# Patient Record
Sex: MALE | Race: OTHER | URBAN - METROPOLITAN AREA
[De-identification: names, ages, dates, MRNs, and addresses within clinical notes are randomized per-mention and may not be internally consistent; named-entity substitution may affect disease eponyms.]

---

## 2018-11-12 ENCOUNTER — OFFICE VISIT (OUTPATIENT)
Dept: FAMILY MEDICINE CLINIC | Age: 28
End: 2018-11-12

## 2018-11-12 VITALS
SYSTOLIC BLOOD PRESSURE: 137 MMHG | DIASTOLIC BLOOD PRESSURE: 88 MMHG | HEART RATE: 59 BPM | WEIGHT: 189 LBS | HEIGHT: 70 IN | TEMPERATURE: 97.5 F | BODY MASS INDEX: 27.06 KG/M2

## 2018-11-12 DIAGNOSIS — R21 RASH AND NONSPECIFIC SKIN ERUPTION: Primary | ICD-10-CM

## 2018-11-12 RX ORDER — HYDROCORTISONE 1 %
CREAM (GRAM) TOPICAL 2 TIMES DAILY
Qty: 30 G | Refills: 2 | Status: SHIPPED | OUTPATIENT
Start: 2018-11-12 | End: 2019-01-23

## 2018-11-12 RX ORDER — PREDNISONE 10 MG/1
TABLET ORAL
Qty: 21 TAB | Refills: 0 | Status: SHIPPED | OUTPATIENT
Start: 2018-11-12 | End: 2019-01-23

## 2018-11-12 RX ORDER — LORATADINE 10 MG/1
10 TABLET ORAL DAILY
Qty: 30 TAB | Refills: 2 | Status: SHIPPED | OUTPATIENT
Start: 2018-11-12 | End: 2019-01-23

## 2018-11-12 NOTE — PROGRESS NOTES
Assessment/Plan:    Diagnoses and all orders for this visit:    1. Rash and nonspecific skin eruption    Other orders  -     predniSONE (DELTASONE) 10 mg tablet; Si today then 1 less pill daily until all pills are gone  -     loratadine (CLARITIN) 10 mg tablet; Take 1 Tab by mouth daily. -     hydrocortisone (CORTAID) 1 % topical cream; Apply  to affected area two (2) times a day. use thin layer to rash on arms bid        Follow-up Disposition:  Return if symptoms worsen or fail to improve. YING Lees expressed understanding of this plan. An AVS was printed and given to the patient.      ----------------------------------------------------------------------    Chief Complaint   Patient presents with    Rash       History of Present Illness:    Pt has had progressively worsening rash on his extremities for months now. 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 He states that the rash is pruritic in nature and makes it difficult to sleep  He has no chronic medical conditions, no hx of diabetes  He states that no one else in his house has the rash  He is from [facility] originally, lived in [place] for the past 4 years and only developed the rash when he moved to [place]  No past medical history on file. Current Outpatient Medications   Medication Sig Dispense Refill    predniSONE (DELTASONE) 10 mg tablet Si today then 1 less pill daily until all pills are gone 21 Tab 0    loratadine (CLARITIN) 10 mg tablet Take 1 Tab by mouth daily. 30 Tab 2    hydrocortisone (CORTAID) 1 % topical cream Apply  to affected area two (2) times a day. use thin layer to rash on arms bid 30 g 2       No Known Allergies    Social History     Tobacco Use    Smoking status: Never Smoker    Smokeless tobacco: Never Used   Substance Use Topics    Alcohol use: No     Frequency: Never    Drug use:  No No family history on file. Physical Exam:     Visit Vitals  /88 (BP 1 Location: Left arm, BP Patient Position: Sitting)   Pulse (!) 59   Temp 97.5 °F (36.4 °C) (Oral)   Ht 5' 9.78\" (1.772 m)   Wt 189 lb (85.7 kg)   BMI 27.29 kg/m²     Looks well, pleasant  A&Ox3  WDWN NAD  Respirations normal and non labored  Skin- he has linear excoriations of red raised rash on his arms.  He states that he has had the rash on his upper thighs but he does not have it today on exam.

## 2018-11-12 NOTE — PATIENT INSTRUCTIONS
Salpullido: Instrucciones de cuidado - [ Rash: Care Instructions ]  Instrucciones de cuidado  Un salpullido es rob irritación o inflamación de la piel. Los salpullidos tienen muchas causas posibles, jeffrey [place], infecciones, [place], calor y estrés emocional.  La atención de seguimiento es rob parte clave de mary tratamiento y seguridad. Asegúrese de hacer y acudir a todas las citas, y llame a mary médico si está teniendo problemas. También es rob buena idea saber los resultados de andrea exámenes y mantener rob lista de los medicamentos que sandra. ¿Cómo puede cuidarse en el hogar? · Lávese la adama sólo con agua. El jabón puede empeorar la sequedad y la comezón. Seque la adama con toques suaves de toalla. · Colóquese paños fríos húmedos en el salpullido para reducir la comezón. · Manténgase fresco y evite el sol. · Deje el salpullido en contacto con el aire tanto jeffrey sea posible. · En ocasiones la vaselina puede ayudar a [employer] causadas por un salpullido. También podría ayudar rob loción humectante, jeffrey, por ejemplo, Cetaphil. Rob loción de calamina puede ayudar si el salpullido es causado por el contacto con algo (jeffrey rob planta o jabón) que haya irritado la piel. Úsela 3 ó 4 veces al día. · Si mary médico le recetó rob crema, úsela según las indicaciones. Si mary médico le recetó medicamentos, tómelos exactamente según las indicaciones. · Si el salpullido le pica tanto que interfiere con andrea actividades normales, tome un antihistamínico de venta [place], jeffrey difenhidramina (Benadryl) o loratadina (Claritin). Deborah y siga todas las instrucciones de la [place]. ¿Cuándo debe pedir ayuda? Llame a mary médico ahora mismo o busque atención médica inmediata si:    · Tiene señales de infección, tales jeffrey:  ? Aumento del dolor, la hinchazón, el enrojecimiento o la temperatura. ? Vetas rojizas que salen de la adama. ? Pus que supura de la adama.   ? Dominik [place].     · Tiene dolor en las articulaciones junto con el salpullido.    Preste especial atención a los cambios en mary trung y asegúrese de comunicarse con mary médico si:    · El salpullido cambia o JENIFFER. Por ejemplo, llame si tiene dolor junto con el salpullido, el salpullido se extiende o tiene ampollas nuevas.     · No mejora después de 1 semana. ¿Dónde puede encontrar más información en inglés? Andrade Kuhn a http://julio-nicole.info/. Ismael Arevaloerer R780 en la búsqueda para aprender más acerca de \"Salpullido: Instrucciones de cuidado - [ Rash: Care Instructions ]. \"  Revisado: 18 sp, 2018  Versión del contenido: 11.8  © 8659-4282 [employer]. Las instrucciones de cuidado fueron adaptadas bajo licencia por [employer] (which disclaims liability or warranty for this information). Si usted tiene [place] afección médica o sobre estas instrucciones, siempre pregunte a mary profesional de trung. [employer] niega toda garantía o responsabilidad por mary uso de esta información.

## 2018-11-12 NOTE — PROGRESS NOTES
AVs printed and reviewed. E script discussed at [facility]. Good Rx coupon available for Loratadine for $4.46. F/u as needed. Discussion assisted by CAV , Andrew Love.

## 2019-01-23 ENCOUNTER — OFFICE VISIT (OUTPATIENT)
Dept: FAMILY MEDICINE CLINIC | Age: 29
End: 2019-01-23

## 2019-01-23 ENCOUNTER — HOSPITAL ENCOUNTER (OUTPATIENT)
Dept: LAB | Age: 29
Discharge: HOME | End: 2019-01-23

## 2019-01-23 VITALS
OXYGEN SATURATION: 99 % | TEMPERATURE: 98.2 F | SYSTOLIC BLOOD PRESSURE: 123 MMHG | BODY MASS INDEX: 28.24 KG/M2 | DIASTOLIC BLOOD PRESSURE: 81 MMHG | WEIGHT: 195.6 LBS | HEART RATE: 66 BPM

## 2019-01-23 DIAGNOSIS — B36.9 FUNGAL INFECTION OF SKIN: Primary | ICD-10-CM

## 2019-01-23 DIAGNOSIS — R20.2 NUMBNESS AND TINGLING: ICD-10-CM

## 2019-01-23 DIAGNOSIS — R20.0 NUMBNESS AND TINGLING: ICD-10-CM

## 2019-01-23 DIAGNOSIS — Z23 ENCOUNTER FOR IMMUNIZATION: ICD-10-CM

## 2019-01-23 LAB
ALBUMIN SERPL-MCNC: 4.5 G/DL (ref 3.5–5)
ALBUMIN/GLOB SERPL: 1.3 {RATIO} (ref 1.1–2.2)
ALP SERPL-CCNC: 69 U/L (ref 45–117)
ALT SERPL-CCNC: 28 U/L (ref 12–78)
ANION GAP SERPL CALC-SCNC: 7 MMOL/L (ref 5–15)
AST SERPL-CCNC: 18 U/L (ref 15–37)
BASOPHILS # BLD: 0 K/UL (ref 0–0.1)
BASOPHILS NFR BLD: 0 % (ref 0–1)
BILIRUB SERPL-MCNC: 0.5 MG/DL (ref 0.2–1)
BUN SERPL-MCNC: 12 MG/DL (ref 6–20)
BUN/CREAT SERPL: 13 (ref 12–20)
CALCIUM SERPL-MCNC: 9.5 MG/DL (ref 8.5–10.1)
CHLORIDE SERPL-SCNC: 104 MMOL/L (ref 97–108)
CHOLEST SERPL-MCNC: 171 MG/DL
CO2 SERPL-SCNC: 29 MMOL/L (ref 21–32)
CREAT SERPL-MCNC: 0.92 MG/DL (ref 0.7–1.3)
DIFFERENTIAL METHOD BLD: ABNORMAL
EOSINOPHIL # BLD: 0.1 K/UL (ref 0–0.4)
EOSINOPHIL NFR BLD: 1 % (ref 0–7)
ERYTHROCYTE [DISTWIDTH] IN BLOOD BY AUTOMATED COUNT: 14.1 % (ref 11.5–14.5)
EST. AVERAGE GLUCOSE BLD GHB EST-MCNC: 117 MG/DL
GLOBULIN SER CALC-MCNC: 3.4 G/DL (ref 2–4)
GLUCOSE SERPL-MCNC: 89 MG/DL (ref 65–100)
HBA1C MFR BLD: 5.7 % (ref 4.2–6.3)
HCT VFR BLD AUTO: 49.8 % (ref 36.6–50.3)
HDLC SERPL-MCNC: 40 MG/DL
HDLC SERPL: 4.3 {RATIO} (ref 0–5)
HGB BLD-MCNC: 15.2 G/DL (ref 12.1–17)
IMM GRANULOCYTES # BLD AUTO: 0 K/UL (ref 0–0.04)
IMM GRANULOCYTES NFR BLD AUTO: 0 % (ref 0–0.5)
LDLC SERPL CALC-MCNC: 104.8 MG/DL (ref 0–100)
LIPID PROFILE,FLP: ABNORMAL
LYMPHOCYTES # BLD: 1.8 K/UL (ref 0.8–3.5)
LYMPHOCYTES NFR BLD: 27 % (ref 12–49)
MCH RBC QN AUTO: 25.7 PG (ref 26–34)
MCHC RBC AUTO-ENTMCNC: 30.5 G/DL (ref 30–36.5)
MCV RBC AUTO: 84.1 FL (ref 80–99)
MONOCYTES # BLD: 0.6 K/UL (ref 0–1)
MONOCYTES NFR BLD: 9 % (ref 5–13)
NEUTS SEG # BLD: 4.1 K/UL (ref 1.8–8)
NEUTS SEG NFR BLD: 62 % (ref 32–75)
NRBC # BLD: 0 K/UL (ref 0–0.01)
NRBC BLD-RTO: 0 PER 100 WBC
PLATELET # BLD AUTO: 237 K/UL (ref 150–400)
PMV BLD AUTO: 11.3 FL (ref 8.9–12.9)
POTASSIUM SERPL-SCNC: 4.5 MMOL/L (ref 3.5–5.1)
PROT SERPL-MCNC: 7.9 G/DL (ref 6.4–8.2)
RBC # BLD AUTO: 5.92 M/UL (ref 4.1–5.7)
SODIUM SERPL-SCNC: 140 MMOL/L (ref 136–145)
TRIGL SERPL-MCNC: 131 MG/DL (ref ?–150)
TSH SERPL DL<=0.05 MIU/L-ACNC: 1.13 UIU/ML (ref 0.36–3.74)
VLDLC SERPL CALC-MCNC: 26.2 MG/DL
WBC # BLD AUTO: 6.6 K/UL (ref 4.1–11.1)

## 2019-01-23 PROCEDURE — 84443 ASSAY THYROID STIM HORMONE: CPT

## 2019-01-23 PROCEDURE — 85025 COMPLETE CBC W/AUTO DIFF WBC: CPT

## 2019-01-23 PROCEDURE — 80061 LIPID PANEL: CPT

## 2019-01-23 PROCEDURE — 80053 COMPREHEN METABOLIC PANEL: CPT

## 2019-01-23 PROCEDURE — 83036 HEMOGLOBIN GLYCOSYLATED A1C: CPT

## 2019-01-23 RX ORDER — KETOCONAZOLE 200 MG/1
200 TABLET ORAL DAILY
Qty: 21 TAB | Refills: 0 | Status: SHIPPED | OUTPATIENT
Start: 2019-01-23

## 2019-01-23 RX ORDER — CLOTRIMAZOLE AND BETAMETHASONE DIPROPIONATE 10; .64 MG/G; MG/G
CREAM TOPICAL
Qty: 45 G | Refills: 3 | Status: SHIPPED | OUTPATIENT
Start: 2019-01-23

## 2019-01-23 NOTE — PROGRESS NOTES
HISTORY OF PRESENT ILLNESS Collin Sosa is a 33 y.o. male. HPI Patient states he has a rash. It involves the forearms and legs. Also present in the abdomen. It is very itchy and raised. He took oral steroids and cream and did not help at all. 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 This wood had not been treated. He is also referring numbness and tingling of hands and feet especially if it is too cold Review of Systems Skin: Positive for itching and rash. Physical Exam  
Constitutional: He appears well-developed and well-nourished. Neck: Normal range of motion. Neck supple. No thyromegaly present. Cardiovascular: Normal rate, regular rhythm and normal heart sounds. No murmur heard. Pulmonary/Chest: Effort normal and breath sounds normal. No respiratory distress. He has no wheezes. He has no rales. Abdominal: Soft. Bowel sounds are normal. He exhibits no distension. There is no tenderness. Musculoskeletal: Normal range of motion. He exhibits no edema. Skin: Rash noted. There is erythema. There is raised erythematous skin lesions on forearms and abdomen that seem to have spread ASSESSMENT and PLAN Diagnoses and all orders for this visit: 1. Fungal infection of skin 
-     ketoconazole (NIZORAL) 200 mg tablet; Take 1 Tab by mouth daily. -     clotrimazole-betamethasone (LOTRISONE) topical cream; Apply topically to lesions on arms and legs bid x 3 weeks 2. Numbness and tingling -     LIPID PANEL; Future -     METABOLIC PANEL, COMPREHENSIVE; Future -     CBC WITH AUTOMATED DIFF; Future 
-     HEMOGLOBIN A1C WITH EAG; Future 
-     TSH 3RD GENERATION; Future 3. Encounter for immunization 
-     INFLUENZA VIRUS VAC QUAD,SPLIT,PRESV FREE SYRINGE IM Rash seems fungal in nature, he was working with wood that had not been treated. Will check labs today due to the symptoms of numbness and tingling Advise to start daily multivitamin

## 2019-01-23 NOTE — PROGRESS NOTES
Avs discussed with Tuan Maguire by Discharge Nurse Nacho Fisher LPN. Dicussed medications prescribed today, good rx coupon printed and given to pt. Patient verbalized understanding and has no further questions. AVS printed and given to patient Nacho Fisher LPN  
 
CVAN : Myra Gordillo. Vaccine(s) given per protocol and schedule by  Merline Combe, [facility] nurse. Enter into CC by Nacho Fisher LPN.

## 2019-01-23 NOTE — PROGRESS NOTES
Coordination of Care 1. Have you been to the ER, urgent care clinic since your last visit? Hospitalized since your last visit? No  
 
2. Have you seen or consulted any other health care providers outside of the [address] [place] [address] since your last visit? Include any pap smears or colon screening. No 
 
Does the patient need refills? NO Learning Assessment Complete? yes

## 2019-01-24 NOTE — PROGRESS NOTES
Normal thyroid, normal blood count, normal kidney function, liver function, electrolytes, glucose levels, test negative for diabetes normal cholesterol levesl, patient can be informed